# Patient Record
Sex: FEMALE | Race: WHITE | Employment: UNEMPLOYED | URBAN - METROPOLITAN AREA
[De-identification: names, ages, dates, MRNs, and addresses within clinical notes are randomized per-mention and may not be internally consistent; named-entity substitution may affect disease eponyms.]

---

## 2022-07-02 ENCOUNTER — APPOINTMENT (OUTPATIENT)
Dept: CT IMAGING | Age: 42
End: 2022-07-02
Payer: COMMERCIAL

## 2022-07-02 ENCOUNTER — HOSPITAL ENCOUNTER (OUTPATIENT)
Age: 42
Setting detail: OBSERVATION
Discharge: HOME | End: 2022-07-03
Attending: EMERGENCY MEDICINE | Admitting: INTERNAL MEDICINE
Payer: COMMERCIAL

## 2022-07-02 ENCOUNTER — APPOINTMENT (OUTPATIENT)
Dept: GENERAL RADIOLOGY | Age: 42
End: 2022-07-02
Payer: COMMERCIAL

## 2022-07-02 DIAGNOSIS — T40.601A OPIATE OVERDOSE, ACCIDENTAL OR UNINTENTIONAL, INITIAL ENCOUNTER (HCC): Primary | ICD-10-CM

## 2022-07-02 DIAGNOSIS — R09.02 HYPOXIA: ICD-10-CM

## 2022-07-02 DIAGNOSIS — R77.8 ELEVATED TROPONIN: ICD-10-CM

## 2022-07-02 DIAGNOSIS — F14.90 COCAINE USE: ICD-10-CM

## 2022-07-02 LAB
A/G RATIO: 1.2 (ref 1.1–2.2)
ALBUMIN SERPL-MCNC: 3.8 G/DL (ref 3.4–5)
ALP BLD-CCNC: 76 U/L (ref 40–129)
ALT SERPL-CCNC: 8 U/L (ref 10–40)
AMPHETAMINE SCREEN, URINE: ABNORMAL
ANION GAP SERPL CALCULATED.3IONS-SCNC: 8 MMOL/L (ref 3–16)
AST SERPL-CCNC: 18 U/L (ref 15–37)
BARBITURATE SCREEN URINE: ABNORMAL
BASOPHILS ABSOLUTE: 0 K/UL (ref 0–0.2)
BASOPHILS RELATIVE PERCENT: 0.2 %
BENZODIAZEPINE SCREEN, URINE: ABNORMAL
BILIRUB SERPL-MCNC: 0.3 MG/DL (ref 0–1)
BILIRUBIN URINE: NEGATIVE
BLOOD, URINE: NEGATIVE
BUN BLDV-MCNC: 11 MG/DL (ref 7–20)
CALCIUM SERPL-MCNC: 8.5 MG/DL (ref 8.3–10.6)
CANNABINOID SCREEN URINE: ABNORMAL
CHLORIDE BLD-SCNC: 102 MMOL/L (ref 99–110)
CLARITY: CLEAR
CO2: 29 MMOL/L (ref 21–32)
COCAINE METABOLITE SCREEN URINE: POSITIVE
COLOR: YELLOW
CREAT SERPL-MCNC: 0.6 MG/DL (ref 0.6–1.1)
EOSINOPHILS ABSOLUTE: 0.2 K/UL (ref 0–0.6)
EOSINOPHILS RELATIVE PERCENT: 1.2 %
ETHANOL: NORMAL MG/DL (ref 0–0.08)
GFR AFRICAN AMERICAN: >60
GFR NON-AFRICAN AMERICAN: >60
GLUCOSE BLD-MCNC: 166 MG/DL (ref 70–99)
GLUCOSE URINE: NEGATIVE MG/DL
HCG QUALITATIVE: NEGATIVE
HCT VFR BLD CALC: 34.7 % (ref 36–48)
HEMOGLOBIN: 11.5 G/DL (ref 12–16)
KETONES, URINE: 15 MG/DL
LACTIC ACID: 0.7 MMOL/L (ref 0.4–2)
LEUKOCYTE ESTERASE, URINE: NEGATIVE
LYMPHOCYTES ABSOLUTE: 1.2 K/UL (ref 1–5.1)
LYMPHOCYTES RELATIVE PERCENT: 7.8 %
Lab: ABNORMAL
MAGNESIUM: 2 MG/DL (ref 1.8–2.4)
MCH RBC QN AUTO: 28.1 PG (ref 26–34)
MCHC RBC AUTO-ENTMCNC: 33.2 G/DL (ref 31–36)
MCV RBC AUTO: 84.7 FL (ref 80–100)
METHADONE SCREEN, URINE: ABNORMAL
MICROSCOPIC EXAMINATION: ABNORMAL
MONOCYTES ABSOLUTE: 1 K/UL (ref 0–1.3)
MONOCYTES RELATIVE PERCENT: 6.4 %
NEUTROPHILS ABSOLUTE: 13.5 K/UL (ref 1.7–7.7)
NEUTROPHILS RELATIVE PERCENT: 84.4 %
NITRITE, URINE: NEGATIVE
OPIATE SCREEN URINE: ABNORMAL
OXYCODONE URINE: ABNORMAL
PDW BLD-RTO: 13.9 % (ref 12.4–15.4)
PH UA: 5
PH UA: 5 (ref 5–8)
PHENCYCLIDINE SCREEN URINE: ABNORMAL
PLATELET # BLD: 399 K/UL (ref 135–450)
PMV BLD AUTO: 7.8 FL (ref 5–10.5)
POTASSIUM SERPL-SCNC: 3.7 MMOL/L (ref 3.5–5.1)
PRO-BNP: 928 PG/ML (ref 0–124)
PROPOXYPHENE SCREEN: ABNORMAL
PROTEIN UA: NEGATIVE MG/DL
RAPID INFLUENZA  B AGN: NEGATIVE
RAPID INFLUENZA A AGN: NEGATIVE
RBC # BLD: 4.09 M/UL (ref 4–5.2)
SARS-COV-2, NAAT: NOT DETECTED
SODIUM BLD-SCNC: 139 MMOL/L (ref 136–145)
SPECIFIC GRAVITY UA: 1.01 (ref 1–1.03)
TOTAL PROTEIN: 7.1 G/DL (ref 6.4–8.2)
TROPONIN: 0.16 NG/ML
URINE REFLEX TO CULTURE: ABNORMAL
URINE TYPE: ABNORMAL
UROBILINOGEN, URINE: 0.2 E.U./DL
WBC # BLD: 16 K/UL (ref 4–11)

## 2022-07-02 PROCEDURE — 83735 ASSAY OF MAGNESIUM: CPT

## 2022-07-02 PROCEDURE — 85025 COMPLETE CBC W/AUTO DIFF WBC: CPT

## 2022-07-02 PROCEDURE — 84703 CHORIONIC GONADOTROPIN ASSAY: CPT

## 2022-07-02 PROCEDURE — 81003 URINALYSIS AUTO W/O SCOPE: CPT

## 2022-07-02 PROCEDURE — 82077 ASSAY SPEC XCP UR&BREATH IA: CPT

## 2022-07-02 PROCEDURE — 80307 DRUG TEST PRSMV CHEM ANLYZR: CPT

## 2022-07-02 PROCEDURE — 83880 ASSAY OF NATRIURETIC PEPTIDE: CPT

## 2022-07-02 PROCEDURE — 6360000004 HC RX CONTRAST MEDICATION: Performed by: EMERGENCY MEDICINE

## 2022-07-02 PROCEDURE — 83605 ASSAY OF LACTIC ACID: CPT

## 2022-07-02 PROCEDURE — 6370000000 HC RX 637 (ALT 250 FOR IP): Performed by: EMERGENCY MEDICINE

## 2022-07-02 PROCEDURE — 71045 X-RAY EXAM CHEST 1 VIEW: CPT

## 2022-07-02 PROCEDURE — 87804 INFLUENZA ASSAY W/OPTIC: CPT

## 2022-07-02 PROCEDURE — 93005 ELECTROCARDIOGRAM TRACING: CPT | Performed by: EMERGENCY MEDICINE

## 2022-07-02 PROCEDURE — 84484 ASSAY OF TROPONIN QUANT: CPT

## 2022-07-02 PROCEDURE — 71260 CT THORAX DX C+: CPT | Performed by: EMERGENCY MEDICINE

## 2022-07-02 PROCEDURE — 80053 COMPREHEN METABOLIC PANEL: CPT

## 2022-07-02 PROCEDURE — 99285 EMERGENCY DEPT VISIT HI MDM: CPT

## 2022-07-02 PROCEDURE — 87635 SARS-COV-2 COVID-19 AMP PRB: CPT

## 2022-07-02 RX ORDER — ACETAMINOPHEN 325 MG/1
650 TABLET ORAL ONCE
Status: COMPLETED | OUTPATIENT
Start: 2022-07-02 | End: 2022-07-02

## 2022-07-02 RX ORDER — ASPIRIN 81 MG/1
324 TABLET, CHEWABLE ORAL ONCE
Status: COMPLETED | OUTPATIENT
Start: 2022-07-02 | End: 2022-07-02

## 2022-07-02 RX ORDER — 0.9 % SODIUM CHLORIDE 0.9 %
1000 INTRAVENOUS SOLUTION INTRAVENOUS ONCE
Status: DISCONTINUED | OUTPATIENT
Start: 2022-07-02 | End: 2022-07-03 | Stop reason: HOSPADM

## 2022-07-02 RX ADMIN — Medication 1000 ML: at 18:20

## 2022-07-02 RX ADMIN — IOPAMIDOL 80 ML: 755 INJECTION, SOLUTION INTRAVENOUS at 19:29

## 2022-07-02 RX ADMIN — ASPIRIN 324 MG: 81 TABLET, CHEWABLE ORAL at 22:07

## 2022-07-02 RX ADMIN — ACETAMINOPHEN 650 MG: 325 TABLET ORAL at 18:17

## 2022-07-02 ASSESSMENT — PAIN SCALES - GENERAL: PAINLEVEL_OUTOF10: 0

## 2022-07-02 ASSESSMENT — PAIN - FUNCTIONAL ASSESSMENT: PAIN_FUNCTIONAL_ASSESSMENT: 0-10

## 2022-07-02 NOTE — ED NOTES
Patient placed on NC 2 LPM. Patient awake, alert and oriented x three. Denies pain/n/v. Pt aware that when I have any admission info I will let her know.      Gray Bennett RN  07/02/22 1728

## 2022-07-02 NOTE — ED PROVIDER NOTES
[facility]  EMERGENCY DEPT VISIT      Patient Identification  Jeffrey Kim is a 42 y.o. female. Chief Complaint   Drug Overdose      History of Present Illness: This is a  42 y.o. female who presents via ambulance to the ED with complaints of an overdose. Patient was in her car stating that she smoked crack and snorted heroin. She remembers reaching back into her backseat and the next thing she remembers is being outside the car with people around her. Apparently bystanders found her unresponsive leaning over the backseat and got her out of the car. She came to shortly afterwards and did not require Narcan per police report. Patient states that she has been not feeling well for the last 3 to 4 days with cough, chest pain, and shortness of breath. No known fever although she did have a temp of 99.5 upon arrival.  The car doors were apparently open at the scene. Patient denies IV drug use for the last few months. She is currently in police custody. Past Medical History:   Diagnosis Date    Depression     Headache(784.0)        Past Surgical History:   Procedure Laterality Date    KNEE SURGERY      TUBAL LIGATION           Current Facility-Administered Medications:     0.9 % sodium chloride bolus, 1,000 mL, IntraVENous, Once, Mitchell Gonsalves MD    aspirin chewable tablet 324 mg, 324 mg, Oral, Once, Mitchell Gonsalves MD    Current Outpatient Medications:     oxycodone-acetaminophen (PERCOCET) 5-325 MG per tablet, Take 1 tablet by mouth every 6 hours as needed. , Disp: , Rfl:     Allergies   Allergen Reactions    Penicillins        Social History     Socioeconomic History    Marital status:      Spouse name: Not on file    Number of children: Not on file    Years of education: Not on file    Highest education level: Not on file   Occupational History    Not on file   Tobacco Use    Smoking status: Never Smoker    Smokeless tobacco: Not on file   Substance and Sexual Activity    Alcohol use: Yes     Comment: occasionally    Drug use: Yes     Types: Other-see comments     Comment: percocet , \"the hard\", cocaine    Sexual activity: Yes     Partners: Male   Other Topics Concern    Not on file   Social History Narrative    Not on file     Social Determinants of Health     Financial Resource Strain:     Difficulty of Paying Living Expenses: Not on file   Food Insecurity:     Worried About Running Out of Food in the Last Year: Not on file    Sony of Food in the Last Year: Not on file   Transportation Needs:     Lack of Transportation (Medical): Not on file    Lack of Transportation (Non-Medical):  Not on file   Physical Activity:     Days of Exercise per Week: Not on file    Minutes of Exercise per Session: Not on file   Stress:     Feeling of Stress : Not on file   Social Connections:     Frequency of Communication with Friends and Family: Not on file    Frequency of Social Gatherings with Friends and Family: Not on file    Attends Confucianism Services: Not on file    Active Member of [address] [place] [address] or Organizations: Not on file    Attends Club or Organization Meetings: Not on file    Marital Status: Not on file   Intimate Partner Violence:     Fear of Current or Ex-Partner: Not on file    Emotionally Abused: Not on file    Physically Abused: Not on file    Sexually Abused: Not on file   Housing Stability:     Unable to Pay for Housing in the Last Year: Not on file    Number of [place] in the Last Year: Not on file    Unstable Housing in the Last Year: Not on file       Nursing Notes Reviewed      ROS:  GENERAL:  No fever, no chills, no diaphoresis, no appetite changes  EYES: no eye discharge, no eye redness, no visual changes  ENT: no nasal congestion, no sore throat  CARDIAC: + chest pain, no palpitations, no leg swelling  PULM: + cough, + shortness of breath  ABD: no abdominal pain, no nausea, no vomiting, no diarrhea  : no dysuria, no hematuria, no urgency, no frequency. No flank pain  MUSCULOSKELETAL: no back pain, no arthralgias, no myalgias  NEURO: no headache, no lightheadedness, no dizziness, no numbness, no weakness, + syncope, no confusion, no speech difficulty  SKIN: no rashes, no erythema, no wounds, no ecchymosis      PHYSICAL EXAM:  GENERAL APPEARANCE: Elizabeth Clark is in no acute respiratory distress. Awake and alert. VITAL SIGNS:   ED Triage Vitals   Enc Vitals Group      BP --       Heart Rate 07/02/22 1713 (!) 123      Resp 07/02/22 1717 14      Temp 07/02/22 1713 99.5 °F (37.5 °C)      Temp Source 07/02/22 1713 Oral      SpO2 07/02/22 1713 (!) 88 %      Weight 07/02/22 1717 120 lb (54.4 kg)      Height 07/02/22 1717 5' 3\" (1.6 m)      Head Circumference --       Peak Flow --       Pain Score --       Pain Loc --       Pain Edu? --       Excl. in GC? --      HEAD: Normocephalic, atraumatic. EYES:  Extraocular muscles are intact. Pupils equal round and reactive to light. Conjunctivas are pink. Negative scleral icterus. ENT:  Mucous membranes are moist.  Pharynx without erythema or exudates. NECK: Nontender and supple. No cervical adenopathy. CHEST: few rhonchi. No rales. No wheezing. Normal respiratory rate. HEART:  Regular rate and regular rhythm. No murmurs. Strong and equal pulses in the upper and lower extremities. ABDOMEN: Soft,  nondistended, positive bowel sounds. abdomen is nontender. No rebound. no guarding. MUSCULOSKELETAL: The calves are nontender to palpation. Active range of motion of the upper and lower extremities. No edema. NEUROLOGICAL: Awake, alert and oriented x 3. Power intact in the upper and lower extremities. Sensation is intact to light touch in the upper and lower extremities. Cranial Nerves 2-12 are intact. No truncal ataxia. No dysarthria or aphasia. DERMATOLOGIC: No petechiae, rashes, or ecchymoses. No erythema. Track marks present  PSYCH: normal mood and affect. Normal thought content.       ED COURSE AND MEDICAL DECISION MAKING:    EKG as interpreted by myself:  sinus tachycardia, [id number]   Axis is   Normal  QTc is  normal  Intervals and Durations are unremarkable. Non specific ST-T wave changes appreciated. No prior EKG    Radiology:  Films have been read by radiologist as noted in chart unless otherwise stated. Other radiologic studies (i.e. CT, MRI, ultrasounds, etc ) have been interpreted by radiologist.     [address]   Final Result      1. No evidence of pulmonary embolus or acute intrathoracic abnormality. 2.  Nonspecific 3 mm nodule in the right upper lobe. Following the Fleischner Society 2017 guidelines for single solid nodules <6 mm, if patient is low risk no routine follow-up is suggested unless suspicious morphology or upper lobe location. If    patient is high risk, then optional CT at 12 months is suggested. XR CHEST PORTABLE   Final Result   1. No findings for acute cardiopulmonary disease. XR CHEST PORTABLE    Result Date: 7/2/2022  PORTABLE CHEST. HISTORY: Short of breath COMPARISON STUDY: 1/22/2011 FINDINGS: Heart size and mediastinal structures appear within normal limits. No localized consolidation or pleural effusion identified. Bony structures are intact. 1. No findings for acute cardiopulmonary disease. CT CHEST PULMONARY EMBOLISM W CONTRAST    Result Date: 7/2/2022  EXAM: CT CHEST PULMONARY EMBOLUS PROTOCOL. INDICATION: Hypoxia COMPARISON: Chest radiograph from one hour prior. TECHNIQUE: Axial CT imaging obtained through the chest using CT pulmonary angiogram protocol. Axial images, multiplanar reformatted images and maximum intensity projection images were reviewed. Departmental dose lowering techniques for CT include automated exposure control, adjustment of the mA and/or kV according to patient size, and use of iterative reconstruction technique. IV Contrast Media and volume: 80 cc Isovue-370.  FINDINGS: DIAGNOSTIC QUALITY: Adequate. PULMONARY EMBOLI: None. RIGHT HEART STRAIN: None. LUNGS AND AIRWAYS: Airways are patent. Mild atelectasis of the lung bases. No focal consolidations. Nonspecific 3 mm subpleural nodule in the right upper lobe on series 2 image 82. PLEURAL / PERICARDIAL SPACES: No pleural or pericardial effusion. HEART / GREAT VESSELS: Normal. ADENOPATHY: None. CHEST WALL / LOWER NECK: No significant abnormality. UPPER ABDOMEN: Normal. BONES: No significant abnormality. OTHER FINDINGS: None. 1.  No evidence of pulmonary embolus or acute intrathoracic abnormality. 2.  Nonspecific 3 mm nodule in the right upper lobe. Following the Fleischner Society 2017 guidelines for single solid nodules <6 mm, if patient is low risk no routine follow-up is suggested unless suspicious morphology or upper lobe location. If patient is high risk, then optional CT at 12 months is suggested.      Labs:  Results for orders placed or performed during the hospital encounter of 07/02/22   Rapid influenza A/B antigens    Specimen: Nasopharyngeal   Result Value Ref Range    Rapid Influenza A Ag Negative Negative    Rapid Influenza B Ag Negative Negative   COVID-19, Rapid    Specimen: Nasopharyngeal Swab   Result Value Ref Range    SARS-CoV-2, NAAT Not Detected Not Detected   CBC with Auto Differential   Result Value Ref Range    WBC 16.0 (H) 4.0 - 11.0 K/uL    RBC 4.09 4.00 - 5.20 M/uL    Hemoglobin 11.5 (L) 12.0 - 16.0 g/dL    Hematocrit 34.7 (L) 36.0 - 48.0 %    MCV 84.7 80.0 - 100.0 fL    MCH 28.1 26.0 - 34.0 pg    MCHC 33.2 31.0 - 36.0 g/dL    RDW 13.9 12.4 - 15.4 %    Platelets 686 254 - 880 K/uL    MPV 7.8 5.0 - 10.5 fL    Neutrophils % 84.4 %    Lymphocytes % 7.8 %    Monocytes % 6.4 %    Eosinophils % 1.2 %    Basophils % 0.2 %    Neutrophils Absolute 13.5 (H) 1.7 - 7.7 K/uL    Lymphocytes Absolute 1.2 1.0 - 5.1 K/uL    Monocytes Absolute 1.0 0.0 - 1.3 K/uL    Eosinophils Absolute 0.2 0.0 - 0.6 K/uL    Basophils Absolute 0.0 0.0 - 0.2 K/uL   Comprehensive Metabolic Panel   Result Value Ref Range    Sodium 139 136 - 145 mmol/L    Potassium 3.7 3.5 - 5.1 mmol/L    Chloride 102 99 - 110 mmol/L    CO2 29 21 - 32 mmol/L    Anion Gap 8 3 - 16    Glucose 166 (H) 70 - 99 mg/dL    BUN 11 7 - 20 mg/dL    CREATININE 0.6 0.6 - 1.1 mg/dL    GFR Non-African American >60 >60    GFR African American >60 >60    Calcium 8.5 8.3 - 10.6 mg/dL    Total Protein 7.1 6.4 - 8.2 g/dL    Albumin 3.8 3.4 - 5.0 g/dL    Albumin/Globulin Ratio 1.2 1.1 - 2.2    Total Bilirubin 0.3 0.0 - 1.0 mg/dL    Alkaline Phosphatase 76 40 - 129 U/L    ALT 8 (L) 10 - 40 U/L    AST 18 15 - 37 U/L   Ethanol   Result Value Ref Range    Ethanol Lvl None Detected mg/dL   Troponin   Result Value Ref Range    Troponin 0.16 (H) <0.01 ng/mL   Magnesium   Result Value Ref Range    Magnesium 2.00 1.80 - 2.40 mg/dL   Lactic Acid   Result Value Ref Range    Lactic Acid 0.7 0.4 - 2.0 mmol/L   Brain Natriuretic Peptide   Result Value Ref Range    Pro- (H) 0 - 124 pg/mL   HCG Qualitative, Serum   Result Value Ref Range    hCG Qual Negative Detects HCG level >10 MIU/mL       Treatment in the department:  Patient received the following while in the ED. Medications   0.9 % sodium chloride bolus (1,000 mLs IntraVENous Bolus from Bag 7/2/22 1820)   aspirin chewable tablet 324 mg (has no administration in time range)   acetaminophen (TYLENOL) tablet 650 mg (650 mg Oral Given 7/2/22 1817)   iopamidol (ISOVUE-370) 76 % injection 80 mL (80 mLs IntraVENous Given 7/2/22 1929)     Patient would intermittently desat to upper 80s on room air. Then increase back to 90s  Intermittently sleeping but arouses very easily to verbal stimuli      Medical decision making:  Patient presents with police escort after an overdose. She did not require narcan and was not apneic at scene. She is now alert and oriented and while she falls asleep occasionally, she wakes with quite voice.  Her intermittent hypoxia is not just with sleeping. No bronchospasm on exam. No pneumonia or CHF on CXR. CTPA with no PE. Nonspecific ST changes. Elevated troponin. Normal renal function. No reports of apnea at scene but could have been previously with demand ischemia causing troponin bump. Also using cocaine so coronary spasm possible. Has prior h/o ICDU but dneies recent so seems unlikely this is related to endocarditis. Admit for further workup. .   Plan to discuss hospitalist. Will have Dr Pedrito Francis take call . As I have deemed necessary from their history, physical, and studies, I have considered and evaluated Orlando Zheng for the following diagnoses: ACS, respiratory failure, pneumonia, aspiration, CHF, PE, septic pulmonary emboli, endocarditis, CHF, overdise           Clinical Impression:  1. Opiate overdose, accidental or unintentional, initial encounter ([facility] [address]    2. Cocaine use    3. Elevated troponin        Dispo:  Patient will be  admitted at this time. Patient was informed of this decision and agrees with plan. I have discussed lab and xray findings with patient and they understand. Questions were answered to the best of my ability. Followup plan:  No follow-up provider specified. Discharge vitals:  Blood pressure 111/77, pulse 94, temperature 99.5 °F (37.5 °C), temperature source Oral, resp. rate 12, height 5' 3\" (1.6 m), weight 120 lb (54.4 kg), SpO2 (!) 88 %. Prescriptions given:   New Prescriptions    No medications on file       This chart was created using Dragon voice recognition software.        Hanny Banks MD  07/02/22 5721

## 2022-07-02 NOTE — ED NOTES
EMD requests nasal 02 at 2LPM, sat drops in the 80's when falls asleep     Clyde Hammans, RN  07/02/22 3841

## 2022-07-03 VITALS
HEIGHT: 63 IN | WEIGHT: 120 LBS | OXYGEN SATURATION: 96 % | SYSTOLIC BLOOD PRESSURE: 115 MMHG | DIASTOLIC BLOOD PRESSURE: 76 MMHG | BODY MASS INDEX: 21.26 KG/M2 | HEART RATE: 86 BPM | RESPIRATION RATE: 16 BRPM | TEMPERATURE: 98.2 F

## 2022-07-03 PROBLEM — F14.10 COCAINE ABUSE (HCC): Status: ACTIVE | Noted: 2022-07-03

## 2022-07-03 LAB
ANION GAP SERPL CALCULATED.3IONS-SCNC: 14 MMOL/L (ref 3–16)
BUN BLDV-MCNC: 11 MG/DL (ref 7–20)
CALCIUM SERPL-MCNC: 8.9 MG/DL (ref 8.3–10.6)
CHLORIDE BLD-SCNC: 102 MMOL/L (ref 99–110)
CO2: 21 MMOL/L (ref 21–32)
CREAT SERPL-MCNC: 0.5 MG/DL (ref 0.6–1.1)
EKG ATRIAL RATE: 114 BPM
EKG DIAGNOSIS: NORMAL
EKG P AXIS: 73 DEGREES
EKG P-R INTERVAL: 158 MS
EKG Q-T INTERVAL: 298 MS
EKG QRS DURATION: 86 MS
EKG QTC CALCULATION (BAZETT): 410 MS
EKG R AXIS: 38 DEGREES
EKG T AXIS: 60 DEGREES
EKG VENTRICULAR RATE: 114 BPM
GFR AFRICAN AMERICAN: >60
GFR NON-AFRICAN AMERICAN: >60
GLUCOSE BLD-MCNC: 54 MG/DL (ref 70–99)
POTASSIUM REFLEX MAGNESIUM: 4.2 MMOL/L (ref 3.5–5.1)
SODIUM BLD-SCNC: 137 MMOL/L (ref 136–145)
TROPONIN: 0.07 NG/ML
TROPONIN: 0.07 NG/ML

## 2022-07-03 PROCEDURE — 80048 BASIC METABOLIC PNL TOTAL CA: CPT

## 2022-07-03 PROCEDURE — 93010 ELECTROCARDIOGRAM REPORT: CPT | Performed by: INTERNAL MEDICINE

## 2022-07-03 PROCEDURE — 36415 COLL VENOUS BLD VENIPUNCTURE: CPT

## 2022-07-03 PROCEDURE — G0378 HOSPITAL OBSERVATION PER HR: HCPCS

## 2022-07-03 PROCEDURE — 84484 ASSAY OF TROPONIN QUANT: CPT

## 2022-07-03 RX ORDER — DICYCLOMINE HCL 20 MG
20 TABLET ORAL EVERY 6 HOURS PRN
Status: DISCONTINUED | OUTPATIENT
Start: 2022-07-03 | End: 2022-07-03 | Stop reason: HOSPADM

## 2022-07-03 RX ORDER — HYDROXYZINE PAMOATE 25 MG/1
50 CAPSULE ORAL EVERY 8 HOURS PRN
Status: DISCONTINUED | OUTPATIENT
Start: 2022-07-03 | End: 2022-07-03 | Stop reason: HOSPADM

## 2022-07-03 RX ORDER — ENOXAPARIN SODIUM 100 MG/ML
40 INJECTION SUBCUTANEOUS DAILY
Status: DISCONTINUED | OUTPATIENT
Start: 2022-07-03 | End: 2022-07-03 | Stop reason: HOSPADM

## 2022-07-03 RX ORDER — ONDANSETRON 2 MG/ML
4 INJECTION INTRAMUSCULAR; INTRAVENOUS EVERY 6 HOURS PRN
Status: DISCONTINUED | OUTPATIENT
Start: 2022-07-02 | End: 2022-07-03 | Stop reason: HOSPADM

## 2022-07-03 RX ORDER — SODIUM CHLORIDE 0.9 % (FLUSH) 0.9 %
5-40 SYRINGE (ML) INJECTION EVERY 12 HOURS SCHEDULED
Status: DISCONTINUED | OUTPATIENT
Start: 2022-07-03 | End: 2022-07-03 | Stop reason: HOSPADM

## 2022-07-03 RX ORDER — POLYETHYLENE GLYCOL 3350 17 G/17G
17 POWDER, FOR SOLUTION ORAL DAILY PRN
Status: DISCONTINUED | OUTPATIENT
Start: 2022-07-02 | End: 2022-07-03 | Stop reason: HOSPADM

## 2022-07-03 RX ORDER — SODIUM CHLORIDE 0.9 % (FLUSH) 0.9 %
5-40 SYRINGE (ML) INJECTION PRN
Status: DISCONTINUED | OUTPATIENT
Start: 2022-07-02 | End: 2022-07-03 | Stop reason: HOSPADM

## 2022-07-03 RX ORDER — SODIUM CHLORIDE 9 MG/ML
INJECTION, SOLUTION INTRAVENOUS PRN
Status: DISCONTINUED | OUTPATIENT
Start: 2022-07-02 | End: 2022-07-03 | Stop reason: HOSPADM

## 2022-07-03 RX ORDER — IBUPROFEN 400 MG/1
800 TABLET ORAL EVERY 8 HOURS PRN
Status: DISCONTINUED | OUTPATIENT
Start: 2022-07-03 | End: 2022-07-03 | Stop reason: HOSPADM

## 2022-07-03 RX ORDER — ONDANSETRON 4 MG/1
4 TABLET, ORALLY DISINTEGRATING ORAL EVERY 8 HOURS PRN
Status: DISCONTINUED | OUTPATIENT
Start: 2022-07-02 | End: 2022-07-03 | Stop reason: HOSPADM

## 2022-07-03 RX ORDER — ACETAMINOPHEN 325 MG/1
650 TABLET ORAL EVERY 6 HOURS PRN
Status: DISCONTINUED | OUTPATIENT
Start: 2022-07-02 | End: 2022-07-03 | Stop reason: HOSPADM

## 2022-07-03 RX ORDER — ACETAMINOPHEN 650 MG/1
650 SUPPOSITORY RECTAL EVERY 6 HOURS PRN
Status: DISCONTINUED | OUTPATIENT
Start: 2022-07-02 | End: 2022-07-03 | Stop reason: HOSPADM

## 2022-07-03 NOTE — ED NOTES
Patient ambulated to restroom to void. Steady gait, denies CP or SOB.      Barbara Baca RN  07/02/22 9592

## 2022-07-03 NOTE — ED NOTES
EMS here, report given. Placed on EMS cardiac monitor and stretcher. Taken to [facility].      Jose Mckay RN  07/02/22 0468

## 2022-07-03 NOTE — H&P
not have any smokeless tobacco history on file. ETOH:   reports current alcohol use. Family History:      Reviewed in detail and negative for CAD, Cancer, CVA. Positive as follows:        Problem Relation Age of Onset    Diabetes Father        REVIEW OF SYSTEMS:   Pertinent positives as noted in the HPI. All other systems reviewed and negative. PHYSICAL EXAM PERFORMED:    /66   Pulse 79   Temp 98 °F (36.7 °C) (Oral)   Resp 16   Ht 5' 3\" (1.6 m)   Wt 120 lb (54.4 kg)   SpO2 95%   BMI 21.26 kg/m²     General appearance:  No apparent distress, appears stated age and cooperative. HEENT:  Normal cephalic, atraumatic without obvious deformity. Pupils equal, round, and reactive to light. Extra ocular muscles intact. Conjunctivae/corneas clear. Neck: Supple, with full range of motion. No jugular venous distention. Trachea midline. Respiratory:  Normal respiratory effort. Clear to auscultation, bilaterally without Rales/Wheezes/Rhonchi. Cardiovascular:  Regular rate and rhythm with normal S1/S2 without murmurs, rubs or gallops. Abdomen: Soft, non-tender, non-distended with normal bowel sounds. Musculoskeletal:  No clubbing, cyanosis or edema bilaterally. Full range of motion without deformity. Skin: Patient denies any IV drug use but few lesions resembling track marks noted on lower extremities and upper extremities. Neurologic:  Neurovascularly intact without any focal sensory/motor deficits.  Cranial nerves: II-XII intact, grossly non-focal.  Psychiatric:  Alert and oriented, thought content appropriate, normal insight  Capillary Refill: Brisk,< 3 seconds   Peripheral Pulses: +2 palpable, equal bilaterally       Labs:     Recent Labs     07/02/22 1811   WBC 16.0*   HGB 11.5*   HCT 34.7*        Recent Labs     07/02/22 1811 07/03/22  0743    137   K 3.7 4.2    102   CO2 29 21   BUN 11 11   CREATININE 0.6 0.5*   CALCIUM 8.5 8.9     Recent Labs     07/02/22 1811   AST 18 ALT 8*   BILITOT 0.3   ALKPHOS 76     No results for input(s): INR in the last 72 hours. Recent Labs     07/02/22  1811 07/03/22  0018 07/03/22  0743   TROPONINI 0.16* 0.07* 0.07*       Urinalysis:      Lab Results   Component Value Date/Time    NITRU Negative 07/02/2022 11:13 PM    BLOODU Negative 07/02/2022 11:13 PM    SPECGRAV 1.010 07/02/2022 11:13 PM    GLUCOSEU Negative 07/02/2022 11:13 PM       Radiology:     CXR: I have reviewed the CXR with the following interpretation:   As mentioned above    EKG:  I have reviewed the EKG with the following interpretation:  As mentioned above    CT CHEST PULMONARY EMBOLISM W CONTRAST   Final Result      1. No evidence of pulmonary embolus or acute intrathoracic abnormality. 2.  Nonspecific 3 mm nodule in the right upper lobe. Following the Fleischner Society 2017 guidelines for single solid nodules <6 mm, if patient is low risk no routine follow-up is suggested unless suspicious morphology or upper lobe location. If    patient is high risk, then optional CT at 12 months is suggested. XR CHEST PORTABLE   Final Result   1. No findings for acute cardiopulmonary disease. ASSESSMENT:    Active Hospital Problems    Diagnosis Date Noted    Cocaine abuse ([facility] [address] [F14.10] 07/03/2022     Priority: Medium    Elevated troponin [R77.8] 07/02/2022     Priority: Medium         PLAN:  Chest pain with elevated troponin:  Likely secondary to demand ischemia/coronary spasm from cocaine use  Patient denies any chest pain currently  Troponin trending down  Discussed with Dr. Lexi Esquivel, recommended illicit drug cessation and outpatient follow-up. No further inpatient work-up required  Patient to follow-up with Dr. Lexi Esquivel outpatient    Illicit drug use:  Discussed and strongly recommended illicit drug cessation    DVT Prophylaxis: Lovenox  Diet: ADULT DIET; Regular  Code Status: Full Code    PT/OT Eval Status:  At her baseline    Dispo -discharge today       [facility] Sonali Cristobal MD    Thank you No primary care provider on file. for the opportunity to be involved in this patient's care. If you have any questions or concerns please feel free to contact me at [phone].

## 2022-07-03 NOTE — PROGRESS NOTES
4 Eyes Admission Assessment     I agree as the admission nurse that 2 RN's have performed a thorough Head to Toe Skin Assessment on the patient. ALL assessment sites listed below have been assessed on admission. Areas assessed by both nurses:   [x]   Head, Face, and Ears   [x]   Shoulders, Back, and Chest  [x]   Arms, Elbows, and Hands   [x]   Coccyx, Sacrum, and Ischium  [x]   Legs, Feet, and Heels        Does the Patient have Skin Breakdown?   No multiple scattered scabs, redness and bruising       Ignacio Prevention initiated:  no  Wound Care Orders initiated:  no      [facility] nurse consulted for Pressure Injury (Stage 3,4, Unstageable, DTI, NWPT, and Complex wounds) or Ignacio score 18 or lower:  No      Nurse 1 eSignature: Electronically signed by Dorothy Burger RN on 7/3/22 at 5:14 AM EDT    **SHARE this note so that the co-signing nurse is able to place an eSignature**    Nurse 2 eSignature: Electronically signed by Juliane Stiles RN on 7/3/22 at 6:27 AM EDT

## 2022-07-03 NOTE — DISCHARGE SUMMARY
rubs or gallops. Abdomen: Soft, non-tender, non-distended with normal bowel sounds. Musculoskeletal:  No clubbing, cyanosis or edema bilaterally. Full range of motion without deformity. Skin: Skin color, texture, turgor normal.  Lesions resembling track marks in bilateral lower and upper extremity  Neurologic:  Neurovascularly intact without any focal sensory/motor deficits. Cranial nerves: II-XII intact, grossly non-focal.  Psychiatric:  Alert and oriented, thought content appropriate, normal insight  Capillary Refill: Brisk,< 3 seconds   Peripheral Pulses: +2 palpable, equal bilaterally       Labs: For convenience and continuity at follow-up the following most recent labs are provided:      CBC:    Lab Results   Component Value Date/Time    WBC 16.0 07/02/2022 06:11 PM    HGB 11.5 07/02/2022 06:11 PM    HCT 34.7 07/02/2022 06:11 PM     07/02/2022 06:11 PM       Renal:    Lab Results   Component Value Date/Time     07/03/2022 07:43 AM    K 4.2 07/03/2022 07:43 AM     07/03/2022 07:43 AM    CO2 21 07/03/2022 07:43 AM    BUN 11 07/03/2022 07:43 AM    CREATININE 0.5 07/03/2022 07:43 AM    CALCIUM 8.9 07/03/2022 07:43 AM         Significant Diagnostic Studies    Radiology:   CT CHEST PULMONARY EMBOLISM W CONTRAST   Final Result      1. No evidence of pulmonary embolus or acute intrathoracic abnormality. 2.  Nonspecific 3 mm nodule in the right upper lobe. Following the Fleischner Society 2017 guidelines for single solid nodules <6 mm, if patient is low risk no routine follow-up is suggested unless suspicious morphology or upper lobe location. If    patient is high risk, then optional CT at 12 months is suggested. XR CHEST PORTABLE   Final Result   1. No findings for acute cardiopulmonary disease.              Consults:     None    Disposition: Home    Condition at Discharge: Stable    Discharge Instructions/Follow-up:  Quit cocaine/heroine or any other illicit drug abuse  Follow up with cardiology    Code Status:  Full Code     Activity: activity as tolerated    Diet: regular diet      Discharge Medications:     Current Discharge Medication List          Time Spent on discharge is more than 30 minutes in the examination, evaluation, counseling and review of medications and discharge plan. Signed:    Johnnie Gusman MD   7/3/2022      Thank you No primary care provider on file. for the opportunity to be involved in this patient's care. If you have any questions or concerns please feel free to contact me at [phone].

## 2022-07-03 NOTE — PLAN OF CARE
Problem: Discharge Planning  Goal: Discharge to home or other facility with appropriate resources  7/3/2022 1629 by Kecia Jaimes RN  Outcome: Adequate for Discharge  Flowsheets (Taken 7/3/2022 0830)  Discharge to home or other facility with appropriate resources:   Identify barriers to discharge with patient and caregiver   Arrange for needed discharge resources and transportation as appropriate   Identify discharge learning needs (meds, wound care, etc)   Refer to discharge planning if patient needs post-hospital services based on physician order or complex needs related to functional status, cognitive ability or social support system  7/3/2022 0508 by Inocencia Donis RN  Outcome: Progressing     Problem: Safety - Adult  Goal: Free from fall injury  7/3/2022 1629 by Kecia Jaimes RN  Outcome: Adequate for Discharge  Flowsheets (Taken 7/3/2022 0830)  Free From Fall Injury: Instruct family/caregiver on patient safety  7/3/2022 0508 by Inocencia Donis RN  Outcome: Progressing     Problem: ABCDS Injury Assessment  Goal: Absence of physical injury  Outcome: Adequate for Discharge  Flowsheets (Taken 7/3/2022 0830)  Absence of Physical Injury: Implement safety measures based on patient assessment

## 2022-07-03 NOTE — ED NOTES
Patient remains alert and oriented, NSR monitor.  Pt aware waiting on bed assignment     Brittney Cifuentes RN  07/02/22 5801

## 2022-07-03 NOTE — ED NOTES
Unable to flush IV L hand. Attempted IV L lower arm without success.      Melisa Broussard RN  07/02/22 8090

## 2022-07-03 NOTE — ED NOTES
IV dressing removed. Angiocath pulled out slightly and flushed without difficulty, NS infusing.      Diane Boles RN  07/02/22 3666

## 2022-07-03 NOTE — PROGRESS NOTES
PT arrived from [address] [place] [address], hospitalist notified, PT lethargic but A&O x4 vitals normal, will continue to monitor

## 2022-07-03 NOTE — PROGRESS NOTES
Patient alert and oriented at the time of discharge teaching, AVS reviewed with patient and patient denies any questions or concerns at this time. No new medications at discharge to review. Patient aware of follow up appts to be scheduled following discharge. IV and monitors removed and patient escorted to the main entrance via staff.

## 2022-08-01 PROBLEM — R77.8 ELEVATED TROPONIN: Status: RESOLVED | Noted: 2022-07-02 | Resolved: 2022-08-01

## 2023-03-21 ENCOUNTER — HOSPITAL ENCOUNTER (EMERGENCY)
Age: 43
Discharge: HOME | End: 2023-03-21
Attending: EMERGENCY MEDICINE
Payer: COMMERCIAL

## 2023-03-21 ENCOUNTER — APPOINTMENT (OUTPATIENT)
Dept: GENERAL RADIOLOGY | Age: 43
End: 2023-03-21
Payer: COMMERCIAL

## 2023-03-21 VITALS
OXYGEN SATURATION: 98 % | BODY MASS INDEX: 21.26 KG/M2 | HEART RATE: 73 BPM | TEMPERATURE: 97.5 F | WEIGHT: 120 LBS | RESPIRATION RATE: 18 BRPM | SYSTOLIC BLOOD PRESSURE: 144 MMHG | HEIGHT: 63 IN | DIASTOLIC BLOOD PRESSURE: 105 MMHG
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
## 2023-03-21 LAB
EKG ATRIAL RATE: 82 BPM
EKG DIAGNOSIS: NORMAL
EKG P AXIS: 69 DEGREES
EKG P-R INTERVAL: 156 MS
EKG Q-T INTERVAL: 382 MS
EKG QRS DURATION: 78 MS
EKG QTC CALCULATION (BAZETT): 446 MS
EKG R AXIS: 41 DEGREES
EKG T AXIS: 51 DEGREES
EKG VENTRICULAR RATE: 82 BPM

## 2023-03-21 PROCEDURE — 71045 X-RAY EXAM CHEST 1 VIEW: CPT

## 2023-03-21 PROCEDURE — 93010 ELECTROCARDIOGRAM REPORT: CPT | Performed by: INTERNAL MEDICINE

## 2023-03-21 PROCEDURE — 99284 EMERGENCY DEPT VISIT MOD MDM: CPT

## 2023-03-21 PROCEDURE — 93005 ELECTROCARDIOGRAM TRACING: CPT | Performed by: EMERGENCY MEDICINE

## 2023-03-21 ASSESSMENT — PAIN - FUNCTIONAL ASSESSMENT: PAIN_FUNCTIONAL_ASSESSMENT: NONE - DENIES PAIN

## 2023-03-21 NOTE — ED NOTES
Discharge instructions discussed. Pt verbalized understanding. Pt has remained NSR during ED visit and has denied CP.  Pt discharged into police custody     Radha Thompson RN  03/21/23 [id number]

## 2023-03-22 NOTE — ED PROVIDER NOTES
Assessment  BP: (!) 144/105  Heart Rate: 73           PHYSICAL EXAM  1 or more Elements     ED Triage Vitals   BP Temp Temp Source Heart Rate Resp SpO2 Height Weight   03/21/23 0031 03/21/23 0031 03/21/23 0031 03/21/23 0031 03/21/23 0031 03/21/23 0031 03/21/23 0029 03/21/23 0029   (!) 144/105 97.5 °F (36.4 °C) Oral 73 18 98 % 5' 3\" (1.6 m) 120 lb (54.4 kg)       Physical Exam  Constitutional:       Appearance: She is well-developed. HENT:      Head: Normocephalic and atraumatic. Mouth/Throat:      Mouth: Mucous membranes are moist.   Eyes:      Extraocular Movements: Extraocular movements intact. Conjunctiva/sclera: Conjunctivae normal.      Pupils: Pupils are equal, round, and reactive to light. Neck:      Trachea: No tracheal deviation. Cardiovascular:      Rate and Rhythm: Normal rate and regular rhythm. Heart sounds: Normal heart sounds. Pulmonary:      Effort: Pulmonary effort is normal.      Breath sounds: Normal breath sounds. Abdominal:      General: There is no distension. Palpations: Abdomen is soft. Tenderness: There is no abdominal tenderness. Musculoskeletal:         General: Normal range of motion. Cervical back: Normal range of motion. Skin:     General: Skin is warm and dry. Neurological:      Mental Status: She is alert. DIAGNOSTIC RESULTS   LABS:    Labs Reviewed - No data to display    When ordered only abnormal lab results are displayed. All other labs were within normal range or not returned as of this dictation. EKG: EKG emergent sinus rhythm ventricular of 82 bpm.  NV interval and QTc interval within normal limits. Has normal axis. There are no significant ST elevations or depressions EKG is nondiagnostic for ACS as interpreted by me. .  Compared to EKG from 7/2/2022 I do not appreciate significant change.      RADIOLOGY:   Non-plain film images such as CT, Ultrasound and MRI are read by the radiologist. Plain radiographic images are